# Patient Record
(demographics unavailable — no encounter records)

---

## 2024-10-14 NOTE — HISTORY OF PRESENT ILLNESS
[FreeTextEntry1] : 54 y/o with known fibroid uterus presents for ER follow up. Was admitted to [facility] with anemia 2/2 AUB from fibroid uterus. Reports h/o UFE which improved her bleeding, however, bleeding has since returned. Denies changes in bowel or bladder habits, change in appetite, unintentional weight loss. Was prescribed norethindrone in the hospital which has helped. 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   OBHx: Ectopic x1 s/p MTX, SAB x4, Septic Ab@21w s/p D&E GynHx: Denies fibroids, cysts, endometriosis, STI's, Abnormal pap smears PMH: denies PSH: D&E x1, UAE (2019), foot surgery Meds: denies Allx: PCN (hives), Codeine (facial numbness) Social History: Denies smoking use, drug use. +occasional social alcohol use

## 2024-10-14 NOTE — PLAN
[FreeTextEntry1] : 52 y/o with AUB 2/2 fibroid uterus  Plan: - Pap test collected - referral for mammogram and colonoscopy - EMB recommended, risks and benefits reviewed, consent signed and witnessed; patient tolerated well - as patient has tried and failed less invasive treatments of AUB, would recommend definitive surgical management with hysterectomy. Patient prefers to wait until end of school year but understands may need to go sooner. For now will continue norethindrone twice daily

## 2024-10-14 NOTE — PROCEDURE
[Cervical Pap Smear] : cervical Pap smear [Endometrial Biopsy] : Endometrial biopsy [Time out performed] : Pre-procedure time out performed.  Patient's name, date of birth and procedure confirmed. [Consent Obtained] : Consent obtained [Irregular Bleeding] : irregular uterine bleeding [Risks] : risks [Benefits] : benefits [Alternatives] : alternatives [Patient] : patient [Infection] : infection [Bleeding] : bleeding [Allergic Reaction] : allergic reaction [Uterine Perforation] : uterine perforation [Pain] : pain [N/A] : pregnancy test not applicable [Betadine] : Betadine [Paracervical Block] : paracervical block was performed [___ mL Injected] : [unfilled] ~UmL of lidocaine [0.01] : 0.01 [Without Epi] : without epinephrine [Tenaculum] : Tenaculum [Easy Passage] : Easy passage [Scant] : scant [Specimen Collected] : collected [Sent to Pathology] : placed in buffered formalin and sent for pathology [Tolerated Well] : Patient tolerated the procedure well [No Complications] : No complications [de-identified] : EMB

## 2024-10-14 NOTE — PHYSICAL EXAM
[Chaperone Present] : A chaperone was present in the examining room during all aspects of the physical examination [11517] : A chaperone was present during the pelvic exam. [FreeTextEntry2] : Dede DOZIER [Appropriately responsive] : appropriately responsive [Alert] : alert [No Acute Distress] : no acute distress [Regular Rate Rhythm] : regular rate rhythm [Soft] : soft [Non-tender] : non-tender [Non-distended] : non-distended [No Mass] : no mass [Oriented x3] : oriented x3 [Labia Majora] : normal [Labia Minora] : normal [Normal] : normal [FreeTextEntry6] : Enlarged, bulky fibroid uterus

## 2024-10-14 NOTE — HISTORY OF PRESENT ILLNESS
[FreeTextEntry1] : 52 y/o with known fibroid uterus presents for ER follow up. Was admitted to CDU with anemia 2/2 AUB from fibroid uterus. Reports h/o UFE which improved her bleeding, however, bleeding has since returned. Denies changes in bowel or bladder habits, change in appetite, unintentional weight loss. Was prescribed norethindrone in the hospital which has helped. 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   OBHx: Ectopic x1 s/p MTX, SAB x4, Septic Ab@21w s/p D&E GynHx: Denies fibroids, cysts, endometriosis, STI's, Abnormal pap smears PMH: denies PSH: D&E x1, UAE (2019), foot surgery Meds: denies Allx: PCN (hives), Codeine (facial numbness) Social History: Denies smoking use, drug use. +occasional social alcohol use

## 2024-10-14 NOTE — PHYSICAL EXAM
[Chaperone Present] : A chaperone was present in the examining room during all aspects of the physical examination [70698] : A chaperone was present during the pelvic exam. [FreeTextEntry2] : Dede DOZIER [Appropriately responsive] : appropriately responsive [Alert] : alert [No Acute Distress] : no acute distress [Regular Rate Rhythm] : regular rate rhythm [Soft] : soft [Non-tender] : non-tender [Non-distended] : non-distended [No Mass] : no mass [Oriented x3] : oriented x3 [Labia Majora] : normal [Labia Minora] : normal [Normal] : normal [FreeTextEntry6] : Enlarged, bulky fibroid uterus

## 2024-10-14 NOTE — PROCEDURE
[Cervical Pap Smear] : cervical Pap smear [Endometrial Biopsy] : Endometrial biopsy [Time out performed] : Pre-procedure time out performed.  Patient's name, date of birth and procedure confirmed. [Consent Obtained] : Consent obtained [Irregular Bleeding] : irregular uterine bleeding [Risks] : risks [Benefits] : benefits [Alternatives] : alternatives [Patient] : patient [Infection] : infection [Bleeding] : bleeding [Allergic Reaction] : allergic reaction [Uterine Perforation] : uterine perforation [Pain] : pain [N/A] : pregnancy test not applicable [Betadine] : Betadine [Paracervical Block] : paracervical block was performed [___ mL Injected] : [unfilled] ~UmL of lidocaine [0.01] : 0.01 [Without Epi] : without epinephrine [Tenaculum] : Tenaculum [Easy Passage] : Easy passage [Scant] : scant [Specimen Collected] : collected [Sent to Pathology] : placed in buffered formalin and sent for pathology [Tolerated Well] : Patient tolerated the procedure well [No Complications] : No complications [de-identified] : EMB

## 2025-02-12 NOTE — PLAN
[FreeTextEntry1] : 52 y/o with AUB 2/2 fibroid uterus for follow up  Plan: - Discussed definitive surgical management with patient, although medical management is improving quality of life and bleeding, she still has episodes of very heavy bleeding - surgical approaches discussed with patient, including laparotomy vs laparoscopy; risks and benefits of each approach discussed with patient; patient works for [employer], so surgical planning would be in July, and she prefers laparoscopy as overall a better recovery - Rx for norethindrone sent to pharmacy - CBC to check Hb as last was collected in Sept 2024 - referral to [facility] for consultation

## 2025-02-12 NOTE — HISTORY OF PRESENT ILLNESS
[FreeTextEntry1] : This visit was provided via telehealth using real-time 2-way audio visual technology. The patient, XANDER AVILA  , was located at home, [address] [place] [address] , at the time of the visit.  The provider, RAOUL PEREZ , was located at the medical office located in [facility] at the time of the visit. The patient XANDER AVILA  and Provider participated in the telehealth encounter.  Verbal consent given on 02/12/2025  by the patient, self.  52 y/o with AUB 2/2 fibroid uterus presents for follow up. Patient has been taking norethindrone 5mg BID and reports bleeding has improved significantly. Last week she did have heavier bleeding for 2 days and felt symptoms of anemia, however, she did not need to go to the hospital. Previous cycles were minimal to normal. Here to discuss plans for definitive surgical management.

## 2025-03-04 NOTE — REASON FOR VISIT
[Home] : at home, [unfilled] , at the time of the visit. [Other Location: e.g. Home (Enter Location, City,State)___] : at [unfilled] [Telehealth (audio & video)] : This visit was provided via telehealth using real-time 2-way audio visual technology. [Verbal consent obtained from patient] : the patient, [unfilled] [Initial Consultation] : an initial consultation for [FreeTextEntry2] : low hgb

## 2025-03-04 NOTE — HISTORY OF PRESENT ILLNESS
[de-identified] : cc:  anemia   53. year old female with Pmhx uterine fibroids with heavy menses Presents for an evaluation of Anemia  She has hx of 6t miscarriages,  last miscarriage was at 4 months gestation to twins. Since then. she has hx of heavy menses and now placed on OCP that have improved in heavy flow, however she continues to spot every day-  every day all of february.  Denies Cardiorespiratory distress.  feels tired/fatigue.  Most recent labs 2/25/25 CBC WBC 5.9 RBC 5.37 hgb 8.5 HCT 30.3% MCV 56.4.  MCH 15.8 , RCDW 22.3 % PLT 480K rest of diff WNL.  She is planned  to ihave fibroidectomy in july.  She weighs 210lbs.  She reports to have sensitive GI. At times she gets constipations   Denies excessive or spontaneous bleeding or bruising. Denies  enlarged nodes. Denies  dysuria,  nocturia,  hesitancy,  urgency, oliguria,  hematuria, incontinence. Denies  nausea,  vomiting,  diarrhea,  Constipation,  heartburn, abdominal pain,  jaundice, melena, blood in stool. Denies  palpitations,  dyspnea, orthopnea, PND, Denies claudication,  edema,  varicose veins, Denies Fever, rigors,  diaphoresis.  Denies anorexia,  weight loss, sleep disturbance.  Denies resting dyspnea, exertional dyspnea, wheezing, cough,  stridor,  hemoptysis, Denies rash,  pruritus,  skin lesions, Denies bone pain,  Myalgia,  arthralgia,   joint swelling,  limited range of motion, Patient does not complain of frequent or severe infections. Patient does not complain of any allergic reactions.   PMHX:  Psx: uterine lining,  right foot sx.   penicillin-  hives,  codeiine -  numbness  Meds :  see list  OB gyn hx :  total pregnancies : 6 , Onset of menses at 12,  Menses q 30 days -  heavy flow -  she is now spotting every day    a week. Fam hx : M- HTN   F -  HTN.   Social hx : ,  works  teacher,  Denies TOB, ETH,  illicit drugs  HCM:  does not have one PCP    mammo/sono : cannot recall  colonoscopy/EGD : none  pap smear: 9/2024 COVID vax: yes COVID infection: no

## 2025-03-04 NOTE — ASSESSMENT
[FreeTextEntry1] : cc:  anemia   53. year old female with Pmhx uterine fibroids with heavy menses Presents for an evaluation of Anemia  She has hx of 6t miscarriages,  last miscarriage was at 4 months gestation to twins. Since then. she has hx of heavy menses and now placed on OCP that have improved in heavy flow, however she continues to spot every day-  every day all of february.  Denies Cardiorespiratory distress.  feels tired/fatigue.  Most recent labs 2/25/25 CBC WBC 5.9 RBC 5.37 hgb 8.5 HCT 30.3% MCV 56.4.  MCH 15.8 , RCDW 22.3 % PLT 480K rest of diff WNL.  She is planned  to ihave fibroidectomy in july.  She weighs 210lbs.     Problem # 1 Anemia of iron deficiency from heavy menses (fibroids)  -she is scheduled to have Fibroidectomy in july 2025 -transfusion of PRBCs is not indicated unless Hgb < 7.00 or patient becomes symptomatic -today's laboratory includes CBC with diff, CMP, total iron & TIBC, ferritin, vitamin B12 and folate levels, reticulocyte count,  EPO,  hgb electrophoresis - hemolytic screen- LDH/hapto.retic - chronic disease markers- ESR/CRP -she is advised to f.u with PCP for GI referral once hgb stable , to r.o GIB -further management decisions will be based on pending results Pt advised to go to local lab at a [facility] and once done to call us back to set up an f/u appointment 2-3 weeks after lab draw   to go over lab results via tele. Pt understands and agrees with plan. All questions answered to patients satisfaction.  tasked ASA to email lab locations to pt and set up a 2 weeks follow up .

## 2025-03-10 NOTE — PHYSICAL EXAM
[FreeTextEntry2] : Mira [FreeTextEntry7] : Soft, NT, bulky uterus [Normal] : normal [FreeTextEntry6] : Bulky, enlarged ~26 wks tall, and ~24 wks wide. Moderate mobility.

## 2025-03-10 NOTE — DISCUSSION/SUMMARY
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 Pt w/HMB, pelvic pain, and mass effects. Uterus tall but very wide. May not be appropriate for a straight forward TLH.  Spoke to pt about her options. Explained that it is feasible to do by lsc but that may not be in her best interest secondary to size. Questions answered.  Plan Will refer back to Dr. Meyer for ALFRED. Pt would like to wait until just after the school year.

## 2025-03-10 NOTE — HISTORY OF PRESENT ILLNESS
[FreeTextEntry1] : 52 yo P0 here for eval and management of ut myomas. Pt considering MIGS approach.  Pt now on OCP to help w/HMB and pain. In Sept 2024 pt presented to ED for HMB. Started OCP then, which helped. Had another episode of HMB in Feb 2025.  Pt had h/o multiple miscarriages.  EMB 10/2024 - inactive EM.

## 2025-03-17 NOTE — PLAN
[FreeTextEntry1] : 54 y/o with AUB from fibroid uterus for follow up  Plan: - Patient s/p MIGS consult, recommend ex-lap over lsc approach given bulkiness of uterus - incision types reviewed, including pfannenstiel vs vertical midline; decision will be made following EUA at time of surgery - discussed BSO at time of surgery, possible vasomotor symptoms following surgery - risks and benefits reviewed, all questions answered - will send aygestin to pharmacy, patient instructed to continue until surgery

## 2025-03-17 NOTE — HISTORY OF PRESENT ILLNESS
[FreeTextEntry1] : 54 y/o with AUB from fibroid uterus presents for follow up Here to discuss and schedule surgery

## 2025-03-20 NOTE — ASSESSMENT
[FreeTextEntry1] :  53. year old female with Pmhx uterine fibroids with heavy menses Presents for an evaluation of Anemia She has hx of 6t miscarriages, last miscarriage was at 4 months gestation to twins. Since then. she has hx of heavy menses and now placed on OCP that have improved in heavy flow, however she continues to spot every day- every day all of february. Denies Cardiorespiratory distress. feels tired/fatigue. Most recent labs 2/25/25 CBC WBC 5.9 RBC 5.37 hgb 8.5 HCT 30.3% MCV 56.4. MCH 15.8 , RCDW 22.3 % PLT 480K rest of diff WNL. She is planned to have fibroidectomy in july. She weighs 210lbs.  Problem # 1 Anemia of iron deficiency from heavy menses (fibroids) -she is scheduled to have Fibroidectomy in july 2025 -transfusion of PRBCs is not indicated unless Hgb < 7.00 or patient becomes symptomatic -today's laboratory includes CBC with diff, CMP, total iron & TIBC, ferritin, vitamin B12 and folate levels, reticulocyte count, EPO, hgb electrophoresis - hemolytic screen- LDH/hapto.retic - chronic disease markers- ESR/CRP -she is advised to f.u with PCP for GI referral once hgb stable , to r.o GIB -further management decisions will be based on pending results Pt advised to go to local lab at a [facility] and once done to call us back to set up an f/u appointment 2-3 weeks after lab draw to go over lab results via tele. Pt understands and agrees with plan. All questions answered to patients satisfaction. tasked ASA to email lab locations to pt and set up a 2 weeks follow up.    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 elevated due to inflammation which can contribute to her anemia but primarily is her having low iron studies. using the Ganzoni formula she is  1300 mg Total iron deficit plan : Will start with weekly  IV iron  feraheme x 2 cycles   -pt is undergoing for fibroidectomy in  7/2025  -also recommending to have GI scopes to r.o any source of GIB.  Risks, benefits, side effects and administration of tx discussed. Patient verbalized understanding and opted for IV iron Pt consented via telehealth today. -emaled scheduling team to call her and set up appointments and billing for approval  -PCP can recheck CBC and or she can call us back and have us check hgb levels.

## 2025-03-20 NOTE — REASON FOR VISIT
[Home] : at home, [unfilled] , at the time of the visit. [Other Location: e.g. Home (Enter Location, City,State)___] : at [unfilled] [Telehealth (audio & video)] : This visit was provided via telehealth using real-time 2-way audio visual technology. [Verbal consent obtained from patient] : the patient, [unfilled] [Follow-Up Visit] : a follow-up visit for

## 2025-03-20 NOTE — END OF VISIT
L ear ache starting 1 hour ago. Pt took tylenol for pain  
[Time Spent: ___ minutes] : I have spent [unfilled] minutes of time on the encounter which excludes teaching and separately reported services.

## 2025-03-20 NOTE — HISTORY OF PRESENT ILLNESS
[de-identified] : cc: anemia  53. year old female with Pmhx uterine fibroids with heavy menses Presents for an evaluation of Anemia She has hx of 6t miscarriages, last miscarriage was at 4 months gestation to twins. Since then. she has hx of heavy menses and now placed on OCP that have improved in heavy flow, however she continues to spot every day- every day all of february. Denies Cardiorespiratory distress. feels tired/fatigue. Most recent labs 2/25/25 CBC WBC 5.9 RBC 5.37 hgb 8.5 HCT 30.3% MCV 56.4. MCH 15.8 , RCDW 22.3 % PLT 480K rest of diff WNL. She is planned to ihave fibroidectomy in july. She weighs 210lbs. She reports to have sensitive GI. At times she gets constipations  Denies excessive or spontaneous bleeding or bruising. Denies enlarged nodes. Denies dysuria, nocturia, hesitancy, urgency, oliguria, hematuria, incontinence. Denies nausea, vomiting, diarrhea, Constipation, heartburn, abdominal pain, jaundice, melena, blood in stool. Denies palpitations, dyspnea, orthopnea, PND, Denies claudication, edema, varicose veins, Denies Fever, rigors, diaphoresis. Denies anorexia, weight loss, sleep disturbance. Denies resting dyspnea, exertional dyspnea, wheezing, cough, stridor, hemoptysis, Denies rash, pruritus, skin lesions, Denies bone pain, Myalgia, arthralgia, joint swelling, limited range of motion, Patient does not complain of frequent or severe infections. Patient does not complain of any allergic reactions.  PMHX: Psx: uterine lining, right foot sx. penicillin- hives, codeiine - numbness Meds : see list OB gyn hx : total pregnancies : 6 , Onset of menses at 12, Menses q 30 days - heavy flow - she is now spotting every day a week. Fam hx : M- HTN F - HTN. 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 [de-identified] : 3/20/25 Pt presents for f.u of recent lab test for work for anemia.

## 2025-05-14 NOTE — PLAN
[FreeTextEntry1] : 52 y/o preop for ALFRED, BSO for HRT counseling  Plan: - Discussed with patient potential for HRT following surgery as we will be performing ALFRED, BSO - depending on symptoms post op, need for HRT will be evaluated - patient voices understanding, all questions answered - for surgery 7/1/25

## 2025-05-14 NOTE — HISTORY OF PRESENT ILLNESS
[FreeTextEntry1] : This visit was provided via telehealth using real-time 2-way audio visual technology. The patient, XANDER AVILA  , was located at home, [address] [place] [address] , at the time of the visit.  The provider, RAOUL PEREZ , was located at the medical office located in [facility] at the time of the visit. The patient XANDER AVILA  and Provider participated in the telehealth encounter.  Verbal consent given on 05/14/2025  by the patient, self.  52 y/o with AUB for fibroid uterus and ALFRED, BSO scheduled on 7/1/25 presents to discuss possible HRT. Patient inquiring whether she will need HRT post op.

## 2025-07-21 NOTE — PLAN
[FreeTextEntry1] : 52 y/o s/p ALFRED, BSO for post op visit  Plan: - Pathology reviewed, normal - shower instructions and wound care provided - RTO in 4 weeks for vaginal cuff check

## 2025-07-21 NOTE — HISTORY OF PRESENT ILLNESS
[FreeTextEntry1] : 54 y/o s/p ALFRED, BSO presents for post op visit Recovering well, incision healing well. Has not taken a full shower since surgery because she was nervous about the incision Ambulating without assistance, passing gas, having normal bowel movements and voiding spontaneously. Pain is well controlled.

## 2025-07-21 NOTE — PHYSICAL EXAM
[Appropriately responsive] : appropriately responsive [Alert] : alert [No Acute Distress] : no acute distress [Regular Rate Rhythm] : regular rate rhythm [Oriented x3] : oriented x3 [FreeTextEntry7] : Incision clean, dry and intact, surrounding skin non erythematous